# Patient Record
Sex: MALE | Race: WHITE | NOT HISPANIC OR LATINO | Employment: FULL TIME | URBAN - METROPOLITAN AREA
[De-identification: names, ages, dates, MRNs, and addresses within clinical notes are randomized per-mention and may not be internally consistent; named-entity substitution may affect disease eponyms.]

---

## 2019-07-20 ENCOUNTER — HOSPITAL ENCOUNTER (EMERGENCY)
Facility: MEDICAL CENTER | Age: 34
End: 2019-07-20
Attending: EMERGENCY MEDICINE
Payer: COMMERCIAL

## 2019-07-20 ENCOUNTER — HOSPITAL ENCOUNTER (OUTPATIENT)
Dept: RADIOLOGY | Facility: MEDICAL CENTER | Age: 34
End: 2019-07-20

## 2019-07-20 VITALS
BODY MASS INDEX: 32.51 KG/M2 | TEMPERATURE: 97.1 F | DIASTOLIC BLOOD PRESSURE: 70 MMHG | HEIGHT: 72 IN | HEART RATE: 80 BPM | RESPIRATION RATE: 19 BRPM | WEIGHT: 240 LBS | SYSTOLIC BLOOD PRESSURE: 122 MMHG | OXYGEN SATURATION: 92 %

## 2019-07-20 DIAGNOSIS — S01.91XA COMPLEX LACERATION OF FACE, INITIAL ENCOUNTER: ICD-10-CM

## 2019-07-20 DIAGNOSIS — S01.01XA LACERATION OF SCALP, INITIAL ENCOUNTER: ICD-10-CM

## 2019-07-20 DIAGNOSIS — S09.90XA CLOSED HEAD INJURY, INITIAL ENCOUNTER: ICD-10-CM

## 2019-07-20 PROCEDURE — A6403 STERILE GAUZE>16 <= 48 SQ IN: HCPCS

## 2019-07-20 PROCEDURE — 700101 HCHG RX REV CODE 250: Performed by: EMERGENCY MEDICINE

## 2019-07-20 PROCEDURE — 304217 HCHG IRRIGATION SYSTEM

## 2019-07-20 PROCEDURE — 99284 EMERGENCY DEPT VISIT MOD MDM: CPT

## 2019-07-20 PROCEDURE — 303747 HCHG EXTRA SUTURE

## 2019-07-20 PROCEDURE — 304991 HCHG REPAIR-COMPLEX-LVL 1, ADD 5 CM

## 2019-07-20 PROCEDURE — 303484 HCHG DRESSING LARGE

## 2019-07-20 PROCEDURE — 304992 HCHG REPAIR-COMPLEX-LVL 1,1.1-7.5CM

## 2019-07-20 RX ORDER — LIDOCAINE HYDROCHLORIDE AND EPINEPHRINE 10; 10 MG/ML; UG/ML
20 INJECTION, SOLUTION INFILTRATION; PERINEURAL ONCE
Status: COMPLETED | OUTPATIENT
Start: 2019-07-20 | End: 2019-07-20

## 2019-07-20 RX ORDER — CEPHALEXIN 500 MG/1
500 CAPSULE ORAL 4 TIMES DAILY
Qty: 28 CAP | Refills: 0 | Status: SHIPPED | OUTPATIENT
Start: 2019-07-20 | End: 2019-07-27

## 2019-07-20 RX ADMIN — LIDOCAINE HYDROCHLORIDE,EPINEPHRINE BITARTRATE 20 ML: 10; .01 INJECTION, SOLUTION INFILTRATION; PERINEURAL at 07:43

## 2019-07-20 NOTE — ED PROVIDER NOTES
ED Provider Note    Scribed for Weston Ortega M.D. by Melanie Forde. 7/20/2019  6:37 AM    Primary care provider: Pcp Pt States None  Means of arrival: Ambulance  History obtained from: Patient  History limited by: None    CHIEF COMPLAINT  Chief Complaint   Patient presents with   • T-5000 GLF     tripped and face planted into a rock, + loc     HPI  Rajeev Valladares is a 33 y.o. male who presents to the Emergency Department as a transfer from [facility] for evaluation of a laceration to the left side of his scalp secondary to GLF. Patient was camping and got up to go to the bathroom when he tripped on a rock and fell forward and face planted into another rock and obtained a large laceration extending from the left eyebrow to the left parietal scalp with positive loss of consciousness. Per wife, she heard a grunt and called his name and he was able to respond. She does not believe he lost consciousness for long but duration is unknown. He does report some left arm pain but otherwise denies any other physical complaints.     REVIEW OF SYSTEMS  Pertinent positives include laceration, loss of consciousness, GLF, arm pain.   Pertinent negatives include no dizziness, lightheadedness, vision changes.  All other systems reviewed and negative. See HPI for further details.     PAST MEDICAL HISTORY    History reviewed. No pertinent past medical history.    SURGICAL HISTORY  Patient has no surgical history.    SOCIAL HISTORY  Social History   Substance Use Topics   • Smoking status: Never Smoker   • Smokeless tobacco: Never Used   • Alcohol use Yes      Comment: daily- 2 to 3 beers      History   Drug Use   • Types: Inhaled     Comment: marijuana       FAMILY HISTORY  History reviewed. No pertinent family history.    CURRENT MEDICATIONS  Home Medications     Reviewed by Beth Draper R.N. (Registered Nurse) on 07/20/19 at 0622  Med List Status: <None>   Medication Last Dose Status        Patient Kenrick Taking any  Medications                       ALLERGIES  No Known Allergies    PHYSICAL EXAM  VITAL SIGNS: /70   Pulse 77   Temp 36.2 °C (97.1 °F) (Oral)   Resp 19   Ht 1.829 m (6')   Wt 108.9 kg (240 lb)   SpO2 97%   BMI 32.55 kg/m²     Nursing note and vitals reviewed.  Constitutional: Well-developed and well-nourished. No distress.   HENT: Very large laceration extending from the left eyebrow to the left parietal scalp measuring approximately 15 cm. Gaping, periosteum visible. Oropharynx is clear and moist without exudate or erythema.   Eyes: Pupils are equal, round, and reactive to light. Conjunctiva are normal.   Cardiovascular: Normal rate and regular rhythm. No murmur heard. Normal radial pulses.  Pulmonary/Chest: Breath sounds normal. No wheezes or rales.   Abdominal: Soft and non-tender. No distention    Musculoskeletal: Extremities exhibit normal range of motion without edema or tenderness.   Neurological: Awake, alert and oriented to person, place, and time. No focal deficits noted.  Skin: Skin is warm and dry. No rash.   Psychiatric: Normal mood and affect. Appropriate for clinical situation.      DIAGNOSTIC STUDIES / PROCEDURES    RADIOLOGY  OUTSIDE IMAGES-CT HEAD   Final Result        The radiologist's interpretation of all radiological studies have been reviewed by me.    COURSE & MEDICAL DECISION MAKING  Nursing notes, VS, PMSFHx reviewed in chart.     6:37 AM - Patient seen and examined at bedside. Viewed CT-head from outlying facility. Plan of care was discussed with patient and family members which includes consult with plastic surgery for repair. Patient will be treated with lidocaine-epinephrine 1000% injection 20 mL.    6:53 AM - Paged plastic surgery.    6:55 AM - Consulted with Dr. Yoder (Plastic Surgery)    9:10 AM - Dr. Yoder (Plastics) performed laceration repair procedure at bedside. After completion, he is cleared to be discharged with instructions to follow up in office this upcoming  week. Patient would like to go back camping. Will write a prescription for antibiotics. Strict ED return precautions discussed. Patient verbalized his understanding and will comply.    The patient will return for new or worsening symptoms and is stable at the time of discharge.    The patient is referred to a primary physician for blood pressure management, diabetic screening, and for all other preventative health concerns.      DISPOSITION:  Patient will be discharged home in stable condition.    FOLLOW UP:  [facility], Emergency Dept  [address] [id number] [phone]    If symptoms worsen    Lasha Yoder M.D.  [address]  [place] [address]  [phone]    Schedule an appointment as soon as possible for a visit         OUTPATIENT MEDICATIONS:  New Prescriptions    CEPHALEXIN (KEFLEX) 500 MG CAP    Take 1 Cap by mouth 4 times a day for 7 days.         FINAL IMPRESSION  1. Closed head injury, initial encounter    2. Laceration of scalp, initial encounter    3. Complex laceration of face, initial encounter          Melanie LAGUNAS ([place]), am scribing for, and in the presence of, Weston Ortega M.D..    Electronically signed by: Melanie Forde ([place]), 7/20/2019    Weston LAGUNAS M.D. personally performed the services described in this documentation, as scribed by Melanie Forde in my presence, and it is both accurate and complete. C    The note accurately reflects work and decisions made by me.  Weston Ortega  7/20/2019  12:56 PM

## 2019-07-20 NOTE — ED NOTES
erp at bedside with RN to expose the pt's wound that was wrapped at sending facility. Upon unwrapping, there is a large scalp wound with skull exposed from his hairline to the top of his head. Bleeding is controlled and the wound is rewrapped.

## 2019-07-20 NOTE — ED TRIAGE NOTES
Chief Complaint   Patient presents with   • T-5000 GLF     tripped and face planted into a rock, + loc     /70   Pulse 82   Temp 36.2 °C (97.1 °F) (Oral)   Resp 20   Ht 1.829 m (6')   Wt 108.9 kg (240 lb)   SpO2 96%   BMI 32.55 kg/m²     Pt bib ems as a transfer from [facility] after a GLF while camping. Pt fell and hit his head onto a rock. Pt + loc, n/v, and lethargic. Pt has an approximately 15cm laceration from his hairline to the coronal region of his head. Pt hooked to monitor, changed into gown, resting on room air. Pt denies pain at this time. Erp to see.     Chart up for eval.

## 2019-08-06 NOTE — OP REPORT
DATE OF SERVICE:  07/20/2019    PREOPERATIVE DIAGNOSIS:  Complex laceration of the forehead and scalp.    POSTOPERATIVE DIAGNOSIS:  Complex laceration of the forehead and scalp.    PROCEDURE PERFORMED:  1.  Complex repair of forehead laceration, 8.5 cm total.  2.  Complex repair of scalp laceration, 9.0 cm total.    SURGEON:  Lasha Yoder MD    ASSISTANT:  None.    ANESTHESIA:  V1 nerve block with 2% lidocaine, as well as additional 1%   lidocaine with epinephrine local.    BRIEF HISTORY:  This is a 33-year-old male who was camping and apparently   tripped early in the morning when he got out of his tent to go to the   bathroom.  He struck his forehead and scalp on a rock and suffered a large   laceration starting from the eyebrow extending upward across his forehead and   into the scalp area.  He was evaluated in the emergency department and I was   consulted due to the nature of the injury.    FINDINGS:  The vertical component of the laceration measured 7.5 cm, with a   1.0 cm lateral extension.  The portion through the scalp, which was just along   the border of the forehead and hair-bearing scalp, measured 9.0 cm.    PROCEDURE IN DETAIL:  After informed consent was obtained, I performed a   supraorbital/supratrochlear nerve block with 2% lidocaine.  After allowing   adequate time for anesthesia to take place, I thoroughly irrigated the wound   with a pressure-type  of sterile saline with dilute bacitracin.    There were some sections that were not completely numb, and 1% lidocaine with   epinephrine was used to anesthetize these.  Once the wound had been thoroughly   cleaned, I evaluated the full extent of the injury.  This did extend full   thickness down through the galea to the periosteum.  The frontalis muscle did   not appear to be significantly injured.  I began by excising nonviable   portions of skin from the skin edges to allow for a  closure.  Once   this had been finished, I then  repaired the galea with several 3-0 Vicryl   sutures.  I then reapproximated the skin edges and closed with 3-0 Monocryl   deep dermal sutures and 4-0 nylon skin sutures.  The patient tolerated the   procedure well and there were no complications.       ____________________________________     NICHOLAS SANTANA MD PSM / KAY    DD:  08/06/2019 11:45:48  DT:  08/06/2019 12:04:55    D#:  [id number]  Job#:  [id number]